# Patient Record
Sex: MALE | Race: WHITE
[De-identification: names, ages, dates, MRNs, and addresses within clinical notes are randomized per-mention and may not be internally consistent; named-entity substitution may affect disease eponyms.]

---

## 2020-04-01 ENCOUNTER — HOSPITAL ENCOUNTER (OUTPATIENT)
Dept: HOSPITAL 62 - RAD | Age: 55
End: 2020-04-01
Attending: INTERNAL MEDICINE
Payer: MEDICARE

## 2020-04-01 DIAGNOSIS — K42.9: Primary | ICD-10-CM

## 2020-04-01 DIAGNOSIS — R10.84: ICD-10-CM

## 2020-04-01 PROCEDURE — 74177 CT ABD & PELVIS W/CONTRAST: CPT

## 2020-04-01 PROCEDURE — 82565 ASSAY OF CREATININE: CPT

## 2020-04-01 NOTE — RADIOLOGY REPORT (SQ)
EXAM DESCRIPTION:  CT ABD/PELVIS WITH IV   ORAL



IMAGES COMPLETED DATE/TIME:  4/1/2020 11:07 am



REASON FOR STUDY:  ABDOMINAL PAIN- GENERALIZED (R10.84) R10.84  GENERALIZED ABDOMINAL PAIN



COMPARISON:  None.



TECHNIQUE:  CT scan of the abdomen and pelvis performed using helical scanning technique with dynamic
 intravenous contrast injection.  Patient was given oral contrast. Images reviewed with lung, soft ti
ssue, and bone windows. Reconstructed coronal and sagittal MPR images reviewed. Delayed images for ev
aluation of the urinary system also acquired. All images stored on PACS.

All CT scanners at this facility use dose modulation, iterative reconstruction, and/or weight based d
osing when appropriate to reduce radiation dose to as low as reasonably achievable (ALARA).

[facility]: Dose Right  [facility]: CareDose    [facility]: Dose Right    [facility]: Teradose 4D    [facility]: Smart Technologies



CONTRAST TYPE AND DOSE:  contrast/concentration: Isovue 350.00 mg/ml; Total Contrast Delivered: 100.0
 ml; Total Saline Delivered: 70.0 ml



RENAL FUNCTION:  Creatinine 0.7



RADIATION DOSE:  CT Rad equipment meets quality standard of care and radiation dose reduction techniq
ues were employed. CTDIvol: 18.6 - 18.7 mGy. DLP: 2078 mGy-cm..



LIMITATIONS:  None.



FINDINGS:  LOWER CHEST: No acute findings.

LIVER: Normal size. No masses.  No dilated ducts.

SPLEEN: Normal size. No focal lesions.

PANCREAS: No masses. No significant calcifications. No adjacent inflammation or peripancreatic fluid 
collections. Pancreatic duct not dilated.

GALLBLADDER: Surgically absent.

ADRENAL GLANDS: No significant masses or asymmetry.

RIGHT KIDNEY AND URETER: No definite solid masses.  Subcentimeter hypodense lesion, likely cyst but d
ifficult to completely characterize.   No significant calcifications.   No hydronephrosis or hydroure
ter.

LEFT KIDNEY AND URETER: No solid masses.   No significant calcifications.   No hydronephrosis or hydr
oureter.

AORTA AND VESSELS: No aneurysm. No dissection. Renal arteries, SMA, celiac without stenosis.

RETROPERITONEUM: No retroperitoneal adenopathy, hemorrhage or masses.

BOWEL AND PERITONEAL CAVITY: No evidence of intestinal obstruction.  No focal bowel wall thickening. 
 Scattered colonic diverticula predominantly within the descending and sigmoid colon.  No definitive 
findings of diverticulitis.

APPENDIX: Normal.

PELVIS: No mass.  No free fluid. Normal bladder.

ABDOMINAL WALL: No masses.  Small fat containing umbilical hernia.

BONES: No significant or acute findings.

OTHER: No other significant finding.



IMPRESSION:  1.  No evidence of acute intra-abdominal/ pelvic process.  Normal appendix.

2.  Small fat containing umbilical hernia.

3.  Prior cholecystectomy.



TECHNICAL DOCUMENTATION:  JOB ID:  [id number]

Quality ID # [id number]: Final reports with documentation of one or more dose reduction techniques (e.g., Au
tomated exposure control, adjustment of the mA and/or kV according to patient size, use of iterative 
reconstruction technique)

 2011 [employer]- All Rights Reserved



Reading location - IP/workstation name: [facility]-

## 2020-05-19 ENCOUNTER — HOSPITAL ENCOUNTER (OUTPATIENT)
Dept: HOSPITAL 62 - RAD | Age: 55
End: 2020-05-19
Attending: INTERNAL MEDICINE
Payer: MEDICARE

## 2020-05-19 DIAGNOSIS — R11.2: Primary | ICD-10-CM

## 2020-05-19 PROCEDURE — 78264 GASTRIC EMPTYING IMG STUDY: CPT

## 2020-05-19 PROCEDURE — A9541 TC99M SULFUR COLLOID: HCPCS

## 2020-05-19 NOTE — RADIOLOGY REPORT (SQ)
EXAM DESCRIPTION:  NM GASTRIC EMPTYING STUDY



IMAGES COMPLETED DATE/TIME:  5/19/2020 12:20 pm



REASON FOR STUDY:  N/V (R11.2) R11.2  NAUSEA WITH VOMITING, UNSPECIFIED



COMPARISON:  None.



RADIONUCLIDE AND DOSE:  2 millicuries Tc-99m Sulfur Colloid.

A wide variety of solid foods have been used.

The route of agent administration: Oral.



TECHNIQUE:  1 minute serial static imaging performed at time of meal, 1 hour, 2 hours, 3 hours, and 4
 hours as needed.  Once stomach reaches 90% emptying, the test is complete. Image intensity values pl
otted with respect to time with linear regression algorithm.



LIMITATIONS:  None.



FINDINGS:  Patient was observed for 4 hours.

Immediate post meal serves as baseline.

Gastric emptying at 60 minutes was 36.8%.

Gastric emptying at 90 minutes was 53.7%

Gastric emptying at 120 minutes was 55%.

Gastric emptying at 180 minutes was 74.3%.

Gastric emptying at 240 minutes was 93.1%

Normal values:

60 minutes: 30-90% retained. If less than 30%, abnormally rapid emptying. If greater than 90%, delaye
d gastric emptying.

120 minutes: <60% retained. If greater than 60%, delayed gastric emptying.

240 minutes: <10% retained. If greater than 10%, delayed gastric emptying.



IMPRESSION:  NORMAL GASTRIC EMPTYING.



TECHNICAL DOCUMENTATION:  JOB ID:  [id number]

 2011 [employer]- All Rights Reserved                           rev-5/18



Reading location - IP/workstation name: [email or web address]